# Patient Record
Sex: MALE | Race: WHITE | NOT HISPANIC OR LATINO | Employment: OTHER | URBAN - METROPOLITAN AREA
[De-identification: names, ages, dates, MRNs, and addresses within clinical notes are randomized per-mention and may not be internally consistent; named-entity substitution may affect disease eponyms.]

---

## 2017-07-09 ENCOUNTER — HOSPITAL ENCOUNTER (OUTPATIENT)
Facility: MEDICAL CENTER | Age: 52
End: 2017-07-11
Attending: EMERGENCY MEDICINE | Admitting: INTERNAL MEDICINE
Payer: MEDICARE

## 2017-07-09 ENCOUNTER — RESOLUTE PROFESSIONAL BILLING HOSPITAL PROF FEE (OUTPATIENT)
Dept: HOSPITALIST | Facility: MEDICAL CENTER | Age: 52
End: 2017-07-09
Payer: MEDICARE

## 2017-07-09 ENCOUNTER — APPOINTMENT (OUTPATIENT)
Dept: RADIOLOGY | Facility: MEDICAL CENTER | Age: 52
End: 2017-07-09
Attending: EMERGENCY MEDICINE
Payer: MEDICARE

## 2017-07-09 ENCOUNTER — APPOINTMENT (OUTPATIENT)
Dept: RADIOLOGY | Facility: MEDICAL CENTER | Age: 52
End: 2017-07-09
Attending: INTERNAL MEDICINE
Payer: MEDICARE

## 2017-07-09 DIAGNOSIS — R47.81 SLURRED SPEECH: ICD-10-CM

## 2017-07-09 DIAGNOSIS — R53.1 WEAKNESS: ICD-10-CM

## 2017-07-09 PROBLEM — B20 HIV (HUMAN IMMUNODEFICIENCY VIRUS INFECTION) (HCC): Status: ACTIVE | Noted: 2017-07-09

## 2017-07-09 PROBLEM — R47.1 DYSARTHRIA: Status: ACTIVE | Noted: 2017-07-09

## 2017-07-09 LAB
ALBUMIN SERPL BCP-MCNC: 3.7 G/DL (ref 3.2–4.9)
ALBUMIN/GLOB SERPL: 1.2 G/DL
ALP SERPL-CCNC: 71 U/L (ref 30–99)
ALT SERPL-CCNC: 16 U/L (ref 2–50)
ANION GAP SERPL CALC-SCNC: 9 MMOL/L (ref 0–11.9)
APPEARANCE UR: CLEAR
APTT PPP: 25.2 SEC (ref 24.7–36)
AST SERPL-CCNC: 34 U/L (ref 12–45)
BASOPHILS # BLD AUTO: 0.6 % (ref 0–1.8)
BASOPHILS # BLD: 0.02 K/UL (ref 0–0.12)
BILIRUB SERPL-MCNC: 2.4 MG/DL (ref 0.1–1.5)
BILIRUB UR QL STRIP.AUTO: NEGATIVE
BNP SERPL-MCNC: 19 PG/ML (ref 0–100)
BUN SERPL-MCNC: 15 MG/DL (ref 8–22)
CALCIUM SERPL-MCNC: 8.3 MG/DL (ref 8.5–10.5)
CHLORIDE SERPL-SCNC: 105 MMOL/L (ref 96–112)
CO2 SERPL-SCNC: 21 MMOL/L (ref 20–33)
COLOR UR: YELLOW
CREAT SERPL-MCNC: 0.61 MG/DL (ref 0.5–1.4)
CULTURE IF INDICATED INDCX: NO UA CULTURE
EKG IMPRESSION: NORMAL
EOSINOPHIL # BLD AUTO: 0.16 K/UL (ref 0–0.51)
EOSINOPHIL NFR BLD: 4.9 % (ref 0–6.9)
ERYTHROCYTE [DISTWIDTH] IN BLOOD BY AUTOMATED COUNT: 44.1 FL (ref 35.9–50)
GFR SERPL CREATININE-BSD FRML MDRD: >60 ML/MIN/1.73 M 2
GLOBULIN SER CALC-MCNC: 3.1 G/DL (ref 1.9–3.5)
GLUCOSE SERPL-MCNC: 83 MG/DL (ref 65–99)
GLUCOSE UR STRIP.AUTO-MCNC: NEGATIVE MG/DL
HCT VFR BLD AUTO: 41.1 % (ref 42–52)
HGB BLD-MCNC: 14.7 G/DL (ref 14–18)
IMM GRANULOCYTES # BLD AUTO: 0.01 K/UL (ref 0–0.11)
IMM GRANULOCYTES NFR BLD AUTO: 0.3 % (ref 0–0.9)
INR PPP: 0.99 (ref 0.87–1.13)
KETONES UR STRIP.AUTO-MCNC: NEGATIVE MG/DL
LACTATE BLD-SCNC: 1.1 MMOL/L (ref 0.5–2)
LEUKOCYTE ESTERASE UR QL STRIP.AUTO: NEGATIVE
LYMPHOCYTES # BLD AUTO: 0.91 K/UL (ref 1–4.8)
LYMPHOCYTES NFR BLD: 28.1 % (ref 22–41)
MCH RBC QN AUTO: 34 PG (ref 27–33)
MCHC RBC AUTO-ENTMCNC: 35.8 G/DL (ref 33.7–35.3)
MCV RBC AUTO: 95.1 FL (ref 81.4–97.8)
MICRO URNS: NORMAL
MONOCYTES # BLD AUTO: 0.39 K/UL (ref 0–0.85)
MONOCYTES NFR BLD AUTO: 12 % (ref 0–13.4)
NEUTROPHILS # BLD AUTO: 1.75 K/UL (ref 1.82–7.42)
NEUTROPHILS NFR BLD: 54.1 % (ref 44–72)
NITRITE UR QL STRIP.AUTO: NEGATIVE
NRBC # BLD AUTO: 0 K/UL
NRBC BLD AUTO-RTO: 0 /100 WBC
PH UR STRIP.AUTO: 7 [PH]
PLATELET # BLD AUTO: 170 K/UL (ref 164–446)
PMV BLD AUTO: 9.8 FL (ref 9–12.9)
POTASSIUM SERPL-SCNC: 3.7 MMOL/L (ref 3.6–5.5)
PROT SERPL-MCNC: 6.8 G/DL (ref 6–8.2)
PROT UR QL STRIP: NEGATIVE MG/DL
PROTHROMBIN TIME: 13.4 SEC (ref 12–14.6)
RBC # BLD AUTO: 4.32 M/UL (ref 4.7–6.1)
RBC UR QL AUTO: NEGATIVE
SODIUM SERPL-SCNC: 135 MMOL/L (ref 135–145)
SP GR UR STRIP.AUTO: 1.02
TROPONIN I SERPL-MCNC: <0.01 NG/ML (ref 0–0.04)
WBC # BLD AUTO: 3.2 K/UL (ref 4.8–10.8)

## 2017-07-09 PROCEDURE — G0378 HOSPITAL OBSERVATION PER HR: HCPCS

## 2017-07-09 PROCEDURE — 86360 T CELL ABSOLUTE COUNT/RATIO: CPT

## 2017-07-09 PROCEDURE — 87535 HIV-1 PROBE&REVERSE TRNSCRPJ: CPT

## 2017-07-09 PROCEDURE — 93005 ELECTROCARDIOGRAM TRACING: CPT | Performed by: EMERGENCY MEDICINE

## 2017-07-09 PROCEDURE — 86618 LYME DISEASE ANTIBODY: CPT

## 2017-07-09 PROCEDURE — A9270 NON-COVERED ITEM OR SERVICE: HCPCS | Performed by: INTERNAL MEDICINE

## 2017-07-09 PROCEDURE — 70553 MRI BRAIN STEM W/O & W/DYE: CPT

## 2017-07-09 PROCEDURE — 80053 COMPREHEN METABOLIC PANEL: CPT

## 2017-07-09 PROCEDURE — 700105 HCHG RX REV CODE 258: Performed by: EMERGENCY MEDICINE

## 2017-07-09 PROCEDURE — 36415 COLL VENOUS BLD VENIPUNCTURE: CPT

## 2017-07-09 PROCEDURE — 71010 DX-CHEST-PORTABLE (1 VIEW): CPT

## 2017-07-09 PROCEDURE — 99220 PR INITIAL OBSERVATION CARE,LEVL III: CPT | Performed by: INTERNAL MEDICINE

## 2017-07-09 PROCEDURE — 99285 EMERGENCY DEPT VISIT HI MDM: CPT

## 2017-07-09 PROCEDURE — 83036 HEMOGLOBIN GLYCOSYLATED A1C: CPT

## 2017-07-09 PROCEDURE — 83605 ASSAY OF LACTIC ACID: CPT

## 2017-07-09 PROCEDURE — 83880 ASSAY OF NATRIURETIC PEPTIDE: CPT

## 2017-07-09 PROCEDURE — 84484 ASSAY OF TROPONIN QUANT: CPT

## 2017-07-09 PROCEDURE — 700117 HCHG RX CONTRAST REV CODE 255: Performed by: INTERNAL MEDICINE

## 2017-07-09 PROCEDURE — 85610 PROTHROMBIN TIME: CPT

## 2017-07-09 PROCEDURE — 700102 HCHG RX REV CODE 250 W/ 637 OVERRIDE(OP): Performed by: INTERNAL MEDICINE

## 2017-07-09 PROCEDURE — 85730 THROMBOPLASTIN TIME PARTIAL: CPT

## 2017-07-09 PROCEDURE — 87040 BLOOD CULTURE FOR BACTERIA: CPT | Mod: 91

## 2017-07-09 PROCEDURE — 700105 HCHG RX REV CODE 258: Performed by: INTERNAL MEDICINE

## 2017-07-09 PROCEDURE — A9579 GAD-BASE MR CONTRAST NOS,1ML: HCPCS | Performed by: INTERNAL MEDICINE

## 2017-07-09 PROCEDURE — 70450 CT HEAD/BRAIN W/O DYE: CPT

## 2017-07-09 PROCEDURE — 86359 T CELLS TOTAL COUNT: CPT

## 2017-07-09 PROCEDURE — 81003 URINALYSIS AUTO W/O SCOPE: CPT

## 2017-07-09 PROCEDURE — 85025 COMPLETE CBC W/AUTO DIFF WBC: CPT

## 2017-07-09 RX ORDER — POLYETHYLENE GLYCOL 3350 17 G/17G
1 POWDER, FOR SOLUTION ORAL
Status: DISCONTINUED | OUTPATIENT
Start: 2017-07-09 | End: 2017-07-11 | Stop reason: HOSPADM

## 2017-07-09 RX ORDER — IBUPROFEN 200 MG
200 TABLET ORAL EVERY 6 HOURS PRN
Status: DISCONTINUED | OUTPATIENT
Start: 2017-07-09 | End: 2017-07-11 | Stop reason: HOSPADM

## 2017-07-09 RX ORDER — SODIUM CHLORIDE 9 MG/ML
2000 INJECTION, SOLUTION INTRAVENOUS ONCE
Status: COMPLETED | OUTPATIENT
Start: 2017-07-09 | End: 2017-07-09

## 2017-07-09 RX ORDER — RITONAVIR 100 MG/1
100 TABLET ORAL 2 TIMES DAILY WITH MEALS
Status: DISCONTINUED | OUTPATIENT
Start: 2017-07-09 | End: 2017-07-11 | Stop reason: HOSPADM

## 2017-07-09 RX ORDER — IBUPROFEN 200 MG
200 TABLET ORAL EVERY 6 HOURS PRN
COMMUNITY

## 2017-07-09 RX ORDER — SODIUM CHLORIDE 9 MG/ML
1000 INJECTION, SOLUTION INTRAVENOUS ONCE
Status: DISCONTINUED | OUTPATIENT
Start: 2017-07-09 | End: 2017-07-09

## 2017-07-09 RX ORDER — ALPRAZOLAM 0.5 MG/1
1 TABLET ORAL ONCE
Status: COMPLETED | OUTPATIENT
Start: 2017-07-09 | End: 2017-07-09

## 2017-07-09 RX ORDER — SODIUM CHLORIDE 9 MG/ML
INJECTION, SOLUTION INTRAVENOUS CONTINUOUS
Status: DISCONTINUED | OUTPATIENT
Start: 2017-07-09 | End: 2017-07-11 | Stop reason: HOSPADM

## 2017-07-09 RX ORDER — SULFAMETHOXAZOLE AND TRIMETHOPRIM 400; 80 MG/1; MG/1
1 TABLET ORAL DAILY
Status: DISCONTINUED | OUTPATIENT
Start: 2017-07-09 | End: 2017-07-11 | Stop reason: HOSPADM

## 2017-07-09 RX ORDER — BISACODYL 10 MG
10 SUPPOSITORY, RECTAL RECTAL
Status: DISCONTINUED | OUTPATIENT
Start: 2017-07-09 | End: 2017-07-11 | Stop reason: HOSPADM

## 2017-07-09 RX ORDER — ASPIRIN 325 MG
325 TABLET ORAL DAILY
Status: DISCONTINUED | OUTPATIENT
Start: 2017-07-09 | End: 2017-07-11 | Stop reason: HOSPADM

## 2017-07-09 RX ORDER — DIPHENHYDRAMINE HCL 25 MG
25 TABLET ORAL EVERY 8 HOURS PRN
Status: DISCONTINUED | OUTPATIENT
Start: 2017-07-09 | End: 2017-07-11 | Stop reason: HOSPADM

## 2017-07-09 RX ORDER — ASPIRIN 325 MG
325 TABLET ORAL DAILY
COMMUNITY

## 2017-07-09 RX ORDER — AMOXICILLIN 250 MG
2 CAPSULE ORAL 2 TIMES DAILY
Status: DISCONTINUED | OUTPATIENT
Start: 2017-07-09 | End: 2017-07-11 | Stop reason: HOSPADM

## 2017-07-09 RX ORDER — DOXYCYCLINE 100 MG/1
100 TABLET ORAL EVERY 12 HOURS
Status: DISCONTINUED | OUTPATIENT
Start: 2017-07-09 | End: 2017-07-11 | Stop reason: HOSPADM

## 2017-07-09 RX ADMIN — RALTEGRAVIR 400 MG: 400 TABLET, FILM COATED ORAL at 16:31

## 2017-07-09 RX ADMIN — RITONAVIR 100 MG: 100 TABLET, FILM COATED ORAL at 16:31

## 2017-07-09 RX ADMIN — GADODIAMIDE 20 ML: 287 INJECTION INTRAVENOUS at 18:59

## 2017-07-09 RX ADMIN — SODIUM CHLORIDE: 9 INJECTION, SOLUTION INTRAVENOUS at 15:27

## 2017-07-09 RX ADMIN — DIPHENHYDRAMINE HCL 25 MG: 25 TABLET ORAL at 16:30

## 2017-07-09 RX ADMIN — SODIUM CHLORIDE 2000 ML: 9 INJECTION, SOLUTION INTRAVENOUS at 11:26

## 2017-07-09 RX ADMIN — ASPIRIN 325 MG: 325 TABLET, COATED ORAL at 16:29

## 2017-07-09 RX ADMIN — DOXYCYCLINE 100 MG: 100 TABLET ORAL at 20:19

## 2017-07-09 RX ADMIN — RALTEGRAVIR 400 MG: 400 TABLET, FILM COATED ORAL at 20:19

## 2017-07-09 RX ADMIN — SULFAMETHOXAZOLE AND TRIMETHOPRIM 1 TABLET: 400; 80 TABLET ORAL at 16:31

## 2017-07-09 RX ADMIN — ALPRAZOLAM 1 MG: 0.5 TABLET ORAL at 17:57

## 2017-07-09 ASSESSMENT — PAIN SCALES - GENERAL
PAINLEVEL_OUTOF10: 0
PAINLEVEL_OUTOF10: 5
PAINLEVEL_OUTOF10: 0

## 2017-07-09 ASSESSMENT — LIFESTYLE VARIABLES
EVER_SMOKED: NEVER
ALCOHOL_USE: NO
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
## 2017-07-09 NOTE — IP AVS SNAPSHOT
" Home Care Instructions                                                                                                                  Name:Joaquina Suarez  Medical Record Number:[id number]  CSN: [id number]    YOB: 1965   Age: 51 y.o.  Sex: male  HT:1.753 m (5' 9.02\") WT: 77.5 kg (170 lb 13.7 oz)          Admit Date: 7/9/2017     Discharge Date:   Today's Date: 7/11/2017  Attending Doctor:  Brandon Wright M.D.                  Allergies:  Pcn            Discharge Instructions       Discharge Instructions    Discharged to home by taxi with self. Discharged via walking, hospital escort: Refused.  Special equipment needed: Not Applicable    Be sure to schedule a follow-up appointment with your primary care doctor or any specialists as instructed.     Discharge Plan:   Influenza Vaccine Indication: Patient Refuses    I understand that a diet low in cholesterol, fat, and sodium is recommended for good health. Unless I have been given specific instructions below for another diet, I accept this instruction as my diet prescription.   Other diet: Regular    Special Instructions: None    · Is patient discharged on Warfarin / Coumadin?   No     · Is patient Post Blood Transfusion?  No    Depression / Suicide Risk    As you are discharged from this [facility] facility, it is important to learn how to keep safe from harming yourself.    Recognize the warning signs:  · Abrupt changes in personality, positive or negative- including increase in energy   · Giving away possessions  · Change in eating patterns- significant weight changes-  positive or negative  · Change in sleeping patterns- unable to sleep or sleeping all the time   · Unwillingness or inability to communicate  · Depression  · Unusual sadness, discouragement and loneliness  · Talk of wanting to die  · Neglect of personal appearance   · Rebelliousness- reckless behavior  · Withdrawal from people/activities they love  · Confusion- inability to " concentrate     If you or a loved one observes any of these behaviors or has concerns about self-harm, here's what you can do:  · Talk about it- your feelings and reasons for harming yourself  · Remove any means that you might use to hurt yourself (examples: pills, rope, extension cords, firearm)  · Get professional help from the community (Mental Health, Substance Abuse, psychological counseling)  · Do not be alone:Call your Safe Contact- someone whom you trust who will be there for you.  · Call your local CRISIS HOTLINE 483-4265 or 467-742-9998  · Call your local Children's Mobile Crisis Response Team [place] (663) 952-3875 or www.Goko  · Call the toll free National Suicide Prevention Hotlines   · National Suicide Prevention Lifeline 815-634-IWFS (6147)  · [place] [facility] Line Network 800-SUICIDE (041-8598)           Discharge Medication Instructions:    Below are the medications your physician expects you to take upon discharge:    Review all your home medications and newly ordered medications with your doctor and/or pharmacist. Follow medication instructions as directed by your doctor and/or pharmacist.    Please keep your medication list with you and share with your physician.               Medication List      START taking these medications        Instructions    Morning Afternoon Evening Bedtime    doxycycline monohydrate 100 MG tablet   Last time this was given:  100 mg on 7/11/2017 10:25 AM   Commonly known as:  ADOXA        Take 1 Tab by mouth every 12 hours for 14 days.   Dose:  100 mg                        sulfamethoxazole-trimethoprim 400-80 MG Tabs   Last time this was given:  1 Tab on 7/11/2017 10:25 AM   Commonly known as:  BACTRIM        Take 1 Tab by mouth every day for 30 days.   Dose:  1 Tab                          CONTINUE taking these medications        Instructions    Morning Afternoon Evening Bedtime    aspirin 325 MG Tabs   Last time this was given:  325 mg on 7/11/2017  10:25 AM   Commonly known as:  ASA        Take 325 mg by mouth every day.   Dose:  325 mg                        DESCOVY 200-25 MG Tabs   Last time this was given:  1 Tab on 7/11/2017 10:25 AM   Generic drug:  Emtricitabine-Tenofovir AF        Take 1 Tab by mouth every day.   Dose:  1 Tab                        ibuprofen 200 MG Tabs   Commonly known as:  MOTRIN        Take 200 mg by mouth every 6 hours as needed. Indications: Inflammation   Dose:  200 mg                        PREZISTA 600 MG Tabs   Last time this was given:  600 mg on 7/11/2017 10:25 AM   Generic drug:  darunavir        Take 600 mg by mouth 2 times a day, with meals.   Dose:  600 mg                        raltegravir 400 MG Tabs   Last time this was given:  400 mg on 7/11/2017 10:25 AM   Commonly known as:  ISENTRESS        Take 400 mg by mouth 2 Times a Day.   Dose:  400 mg                        ritonavir 100 MG Caps   Commonly known as:  NORVIR        Take 100 mg by mouth 2 times a day, with meals.   Dose:  100 mg                             Where to Get Your Medications      Information about where to get these medications is not yet available     ! Ask your nurse or doctor about these medications    - doxycycline monohydrate 100 MG tablet  - sulfamethoxazole-trimethoprim 400-80 MG Tabs            Instructions           Diet / Nutrition:    Follow any diet instructions given to you by your doctor or the dietician, including how much salt (sodium) you are allowed each day.    If you are overweight, talk to your doctor about a weight reduction plan.    Activity:    Remain physically active following your doctor's instructions about exercise and activity.    Rest often.     Any time you become even a little tired or short of breath, SIT DOWN and rest.    Worsening Symptoms:    Report any of the following signs and symptoms to the doctor's office immediately:    *Pain of jaw, arm, or neck  *Chest pain not relieved by medication                                *Dizziness or loss of consciousness  *Difficulty breathing even when at rest   *More tired than usual                                       *Bleeding drainage or swelling of surgical site  *Swelling of feet, ankles, legs or stomach                 *Fever (>100ºF)  *Pink or blood tinged sputum  *Weight gain (3lbs/day or 5lbs /week)           *Shock from internal defibrillator (if applicable)  *Palpitations or irregular heartbeats                *Cool and/or numb extremities    Stroke Awareness    Common Risk Factors for Stroke include:    Age  Atrial Fibrillation  Carotid Artery Stenosis  Diabetes Mellitus  Excessive alcohol consumption  High blood pressure  Overweight   Physical inactivity  Smoking    Warning signs and symptoms of a stroke include:    *Sudden numbness or weakness of the face, arm or leg (especially on one side of the body).  *Sudden confusion, trouble speaking or understanding.  *Sudden trouble seeing in one or both eyes.  *Sudden trouble walking, dizziness, loss of balance or coordination.Sudden severe headache with no known cause.    It is very important to get treatment quickly when a stroke occurs. If you experience any of the above warning signs, call 911 immediately.                   Disclaimer         Quit Smoking / Tobacco Use:    I understand the use of any tobacco products increases my chance of suffering from future heart disease or stroke and could cause other illnesses which may shorten my life. Quitting the use of tobacco products is the single most important thing I can do to improve my health. For further information on smoking / tobacco cessation call a Toll Free Quit Line at 1-675.373.2204 (*[employer]) or 1-325.193.7316 (American Lung Association) or you can access the web based program at www.lungusa.org.    [place] Tobacco Users Help Line:  (163) 880-7556       Toll Free: 1-865.952.1334  Quit Tobacco Program [facility]  (412) 475-6204    Crisis Hotline:    [place] Crisis Hotline:  5-758-XEEFQBV or 1-926.960.8292    [place] Crisis Hotline:    1-285.790.7761 or 852-282-3144    Discharge Survey:   Thank you for choosing [facility]. We hope we did everything we could to make your hospital stay a pleasant one. You may be receiving a phone survey and we would appreciate your time and participation in answering the questions. Your input is very valuable to us in our efforts to improve our service to our patients and their families.        My signature on this form indicates that:    1. I have reviewed and understand the above information.  2. My questions regarding this information have been answered to my satisfaction.  3. I have formulated a plan with my discharge nurse to obtain my prescribed medications for home.                  Disclaimer         __________________________________                     __________       ________                       Patient Signature                                                 Date                    Time

## 2017-07-09 NOTE — ED NOTES
Med rec complete per patient.  Allergies reviewed.  Pt states he also takes Xarelto 15 mg PRN when he gets leg pain, which he hasn't taken in >1 month.

## 2017-07-09 NOTE — PROGRESS NOTES
1445  Report from RAEANN Rasmussen.    1500  Assumed care of Pt at 1500 when Pt transferred to T215 via [facility], assessment complete.  Pt resting comfortably, A & O X 4, VSS but sinus jake with HR of 52; Pt denies pain, discomfort at this time.  Neuro assessment negative for new deficits;  normal, strong, equal in strength; no limb drift, dysphasia, dysphagia, weakness, confusion.  Bedside dysphagia screening completed; Pt has no problems with choking, gagging, drooling; NIHSS score of 1 for facial droop, which is baseline per Pt due to old CVA.  Pt updated on and understands POC; diet ordered per Dr. Edmondson's written order; Pt given boxed lunch and water; MRI screening sheet completed and faxed; bed in low position, call light within reach - will continue to monitor.
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
## 2017-07-09 NOTE — ED NOTES
"Pt galilea vergara from [place]. Pt was through hiking the [place]. Pt woke up this morning feeling \"weird\". Pt sts he had slurred speech weakness which has since resolved. Pt has hx of cva in 2008 with L sided deficits. Pt also has multiple mosquito bites. vss no acute distress noted.   "

## 2017-07-09 NOTE — ED NOTES
Pt transferred to floor by transport, pt aware of POC, belongings on [facility].

## 2017-07-10 ENCOUNTER — PATIENT OUTREACH (OUTPATIENT)
Dept: HEALTH INFORMATION MANAGEMENT | Facility: OTHER | Age: 52
End: 2017-07-10

## 2017-07-10 ENCOUNTER — APPOINTMENT (OUTPATIENT)
Dept: RADIOLOGY | Facility: MEDICAL CENTER | Age: 52
End: 2017-07-10
Attending: INTERNAL MEDICINE
Payer: MEDICARE

## 2017-07-10 LAB
ANION GAP SERPL CALC-SCNC: 8 MMOL/L (ref 0–11.9)
BUN SERPL-MCNC: 13 MG/DL (ref 8–22)
CALCIUM SERPL-MCNC: 8.3 MG/DL (ref 8.5–10.5)
CHLORIDE SERPL-SCNC: 109 MMOL/L (ref 96–112)
CHOLEST SERPL-MCNC: 146 MG/DL (ref 100–199)
CO2 SERPL-SCNC: 16 MMOL/L (ref 20–33)
CREAT SERPL-MCNC: 0.56 MG/DL (ref 0.5–1.4)
ERYTHROCYTE [DISTWIDTH] IN BLOOD BY AUTOMATED COUNT: 45.1 FL (ref 35.9–50)
EST. AVERAGE GLUCOSE BLD GHB EST-MCNC: 97 MG/DL
GFR SERPL CREATININE-BSD FRML MDRD: >60 ML/MIN/1.73 M 2
GLUCOSE SERPL-MCNC: 85 MG/DL (ref 65–99)
HBA1C MFR BLD: 5 % (ref 0–5.6)
HCT VFR BLD AUTO: 40.8 % (ref 42–52)
HDLC SERPL-MCNC: 50 MG/DL
HGB BLD-MCNC: 14.1 G/DL (ref 14–18)
LDLC SERPL CALC-MCNC: 81 MG/DL
MCH RBC QN AUTO: 34.1 PG (ref 27–33)
MCHC RBC AUTO-ENTMCNC: 34.6 G/DL (ref 33.7–35.3)
MCV RBC AUTO: 98.8 FL (ref 81.4–97.8)
PLATELET # BLD AUTO: 137 K/UL (ref 164–446)
PMV BLD AUTO: 9.8 FL (ref 9–12.9)
POTASSIUM SERPL-SCNC: 3.7 MMOL/L (ref 3.6–5.5)
RBC # BLD AUTO: 4.13 M/UL (ref 4.7–6.1)
SODIUM SERPL-SCNC: 133 MMOL/L (ref 135–145)
TRIGL SERPL-MCNC: 73 MG/DL (ref 0–149)
WBC # BLD AUTO: 3.3 K/UL (ref 4.8–10.8)

## 2017-07-10 PROCEDURE — 99225 PR SUBSEQUENT OBSERVATION CARE,LEVEL II: CPT | Performed by: HOSPITALIST

## 2017-07-10 PROCEDURE — G0378 HOSPITAL OBSERVATION PER HR: HCPCS

## 2017-07-10 PROCEDURE — 85027 COMPLETE CBC AUTOMATED: CPT

## 2017-07-10 PROCEDURE — 700102 HCHG RX REV CODE 250 W/ 637 OVERRIDE(OP): Performed by: INTERNAL MEDICINE

## 2017-07-10 PROCEDURE — A9270 NON-COVERED ITEM OR SERVICE: HCPCS | Performed by: INTERNAL MEDICINE

## 2017-07-10 PROCEDURE — 700105 HCHG RX REV CODE 258: Performed by: INTERNAL MEDICINE

## 2017-07-10 PROCEDURE — 36415 COLL VENOUS BLD VENIPUNCTURE: CPT

## 2017-07-10 PROCEDURE — 96372 THER/PROPH/DIAG INJ SC/IM: CPT

## 2017-07-10 PROCEDURE — 80061 LIPID PANEL: CPT

## 2017-07-10 PROCEDURE — 80048 BASIC METABOLIC PNL TOTAL CA: CPT

## 2017-07-10 PROCEDURE — 700111 HCHG RX REV CODE 636 W/ 250 OVERRIDE (IP): Performed by: INTERNAL MEDICINE

## 2017-07-10 RX ADMIN — SODIUM CHLORIDE: 9 INJECTION, SOLUTION INTRAVENOUS at 01:49

## 2017-07-10 RX ADMIN — DIPHENHYDRAMINE HCL 25 MG: 25 TABLET ORAL at 21:54

## 2017-07-10 RX ADMIN — RALTEGRAVIR 400 MG: 400 TABLET, FILM COATED ORAL at 09:43

## 2017-07-10 RX ADMIN — RITONAVIR 100 MG: 100 TABLET, FILM COATED ORAL at 09:43

## 2017-07-10 RX ADMIN — ENOXAPARIN SODIUM 40 MG: 100 INJECTION SUBCUTANEOUS at 09:43

## 2017-07-10 RX ADMIN — STANDARDIZED SENNA CONCENTRATE AND DOCUSATE SODIUM 2 TABLET: 8.6; 5 TABLET, FILM COATED ORAL at 09:43

## 2017-07-10 RX ADMIN — SULFAMETHOXAZOLE AND TRIMETHOPRIM 1 TABLET: 400; 80 TABLET ORAL at 09:43

## 2017-07-10 RX ADMIN — ASPIRIN 325 MG: 325 TABLET, COATED ORAL at 09:43

## 2017-07-10 RX ADMIN — DOXYCYCLINE 100 MG: 100 TABLET ORAL at 21:51

## 2017-07-10 RX ADMIN — RITONAVIR 100 MG: 100 TABLET, FILM COATED ORAL at 18:57

## 2017-07-10 RX ADMIN — DOXYCYCLINE 100 MG: 100 TABLET ORAL at 09:43

## 2017-07-10 ASSESSMENT — ENCOUNTER SYMPTOMS
NEUROLOGICAL NEGATIVE: 1
SORE THROAT: 0
DOUBLE VISION: 0
MYALGIAS: 1
ROS SKIN COMMENTS: MOSQUITO BITES
HEADACHES: 0
DEPRESSION: 0
BRUISES/BLEEDS EASILY: 0
PSYCHIATRIC NEGATIVE: 1
SHORTNESS OF BREATH: 0
BLURRED VISION: 0
CHILLS: 0
NAUSEA: 0
PALPITATIONS: 0
CARDIOVASCULAR NEGATIVE: 1
DIARRHEA: 1
ABDOMINAL PAIN: 0
CONSTIPATION: 0
TINGLING: 0
EYES NEGATIVE: 1
FOCAL WEAKNESS: 0
WEAKNESS: 0
FEVER: 0
COUGH: 0
DIZZINESS: 0
DIARRHEA: 0
RESPIRATORY NEGATIVE: 1
VOMITING: 0
GASTROINTESTINAL NEGATIVE: 1

## 2017-07-10 ASSESSMENT — PAIN SCALES - GENERAL
PAINLEVEL_OUTOF10: 0
PAINLEVEL_OUTOF10: 0

## 2017-07-10 NOTE — THERAPY
PT orders received. Per RN and OT, pt has been up self. No acute PT needs. Orders D/C'garfield Wright, PT, DPT Pager: [phone]

## 2017-07-10 NOTE — PROGRESS NOTES
Report received from Penny RN. Pt back from MRI, eating dinner. Bed in low position, call light within reach.

## 2017-07-10 NOTE — PROGRESS NOTES
"Report received, assumed patient care.  Pt A&OX4.  Assessment completed.  Call light within reach, personal belongings available, bed in lowest position, pt calling for assistance.  Pt reports no pain.  -n/t pt states, \"It comes and goes.\"  Communication board updated, POC discussed.  Monitors reapplied, VSS.  No additional needs at this time.  "

## 2017-07-10 NOTE — PROGRESS NOTES
Assessed pt. A&O x4. Denies n/t, nausea, pain, and SOB. C/o itchiness, benadryl already given, pt aware. Lab specimen collected and sent to lab. POC discussed with pt. Hourly rounding in place.

## 2017-07-10 NOTE — PROGRESS NOTES
Infectious Disease Progress Note    Author: Meenu Galaviz M.D. DOS & Time created: 7/10/2017  12:21 PM    Chief Complaint:  Chief Complaint   Patient presents with   • Possible Stroke     Interval History:  7/10 AF, WBC 3.3, LP not done, MRI with no acute abnl, having diarrhea which is new  Labs Reviewed, Medications Reviewed, Radiology Reviewed and Wound Reviewed.    Review of Systems:  Review of Systems   Constitutional: Negative for fever and chills.   Respiratory: Negative for cough and shortness of breath.    Gastrointestinal: Positive for diarrhea. Negative for nausea, vomiting and abdominal pain.   Skin: Positive for rash.   Neurological: Negative for headaches.       Hemodynamics:  Temp (24hrs), Av.6 °C (97.8 °F), Min:36.2 °C (97.2 °F), Max:36.8 °C (98.3 °F)  Temperature: 36.6 °C (97.9 °F)  Pulse  Av.1  Min: 52  Max: 68Heart Rate (Monitored): (!) 49  Blood Pressure: 122/74 mmHg, NIBP: 116/71 mmHg       Physical Exam:  Physical Exam   Constitutional: He is oriented to person, place, and time. He appears well-developed.   HENT:   Head: Normocephalic and atraumatic.   Eyes: EOM are normal. Pupils are equal, round, and reactive to light.   Neck: Neck supple.   Cardiovascular: Normal rate, regular rhythm and normal heart sounds.    Pulmonary/Chest: Effort normal and breath sounds normal.   Abdominal: Soft. There is no tenderness.   Musculoskeletal: He exhibits no edema.   Neurological: He is alert and oriented to person, place, and time.   Skin:   Diffuse mosquito bites with scabbing       Meds:    Current facility-administered medications:   •  aspirin (ASA) tablet 325 mg, 325 mg, Oral, DAILY, Ankit Edmondson M.D., 325 mg at 07/10/17 0943  •  darunavir (PREZISTA) tablet 600 mg, 600 mg, Oral, BID WITH MEALS, Ankit Edmondson M.D., 600 mg at 17 1920  •  Emtricitabine-Tenofovir -25 MG TABS 1 Tab, 1 Tab, Oral, DAILY, Ankit Edmondson M.D., 1 Tab at 07/10/17 0900  •  ibuprofen (MOTRIN) tablet  200 mg, 200 mg, Oral, Q6HRS PRN, Ankit Edmondson M.D.  •  raltegravir (ISENTRESS) tablet 400 mg, 400 mg, Oral, BID, Ankit Edmondson M.D., 400 mg at 07/10/17 0943  •  ritonavir (NORVIR) tablet 100 mg, 100 mg, Oral, BID WITH MEALS, Ankit Edmondson M.D., 100 mg at 07/10/17 0943  •  senna-docusate (PERICOLACE or SENOKOT S) 8.6-50 MG per tablet 2 Tab, 2 Tab, Oral, BID, 2 Tab at 07/10/17 0943 **AND** polyethylene glycol/lytes (MIRALAX) PACKET 1 Packet, 1 Packet, Oral, QDAY PRN **AND** magnesium hydroxide (MILK OF MAGNESIA) suspension 30 mL, 30 mL, Oral, QDAY PRN **AND** bisacodyl (DULCOLAX) suppository 10 mg, 10 mg, Rectal, QDAY PRN, Ankit Edmondson M.D.  •  NS infusion, , Intravenous, Continuous, Ankit Edmondson M.D., Last Rate: 125 mL/hr at 07/10/17 0658  •  enoxaparin (LOVENOX) inj 40 mg, 40 mg, Subcutaneous, DAILY, Ankit Edmondson M.D., 40 mg at 07/10/17 0943  •  sulfamethoxazole-trimethoprim (BACTRIM) 400-80 MG per tablet 1 Tab, 1 Tab, Oral, DAILY, Joanne Ivan M.D., 1 Tab at 07/10/17 0943  •  doxycycline monohydrate (ADOXA) tablet 100 mg, 100 mg, Oral, Q12HRS, Ankit Edmondson M.D., 100 mg at 07/10/17 0943  •  diphenhydrAMINE (BENADRYL) tablet/capsule 25 mg, 25 mg, Oral, Q8HRS PRN, Ankit Edmondson M.D., 25 mg at 07/09/17 1630    Labs:  Recent Labs      07/09/17   1004  07/10/17   0330   WBC  3.2*  3.3*   RBC  4.32*  4.13*   HEMOGLOBIN  14.7  14.1   HEMATOCRIT  41.1*  40.8*   MCV  95.1  98.8*   MCH  34.0*  34.1*   RDW  44.1  45.1   PLATELETCT  170  137*   MPV  9.8  9.8   NEUTSPOLYS  54.10   --    LYMPHOCYTES  28.10   --    MONOCYTES  12.00   --    EOSINOPHILS  4.90   --    BASOPHILS  0.60   --      Recent Labs      07/09/17   1004  07/10/17   0330   SODIUM  135  133*   POTASSIUM  3.7  3.7   CHLORIDE  105  109   CO2  21  16*   GLUCOSE  83  85   BUN  15  13     Recent Labs      07/09/17   1004  07/10/17   0330   ALBUMIN  3.7   --    TBILIRUBIN  2.4*   --    ALKPHOSPHAT  71   --    TOTPROTEIN  6.8   --    ALTSGPT  16    --    ASTSGOT  34   --    CREATININE  0.61  0.56       Imaging:  Ct-head W/o    7/9/2017 7/9/2017 11:10 AM HISTORY/REASON FOR EXAM:  Neurological Deficit. History of brain surgery TECHNIQUE/EXAM DESCRIPTION AND NUMBER OF VIEWS: CT of the head without contrast. The study was performed on a helical multidetector CT scanner. Contiguous 2.5 mm axial sections were obtained from the skull base through the vertex. Up to date radiation dose reduction adjustments have been utilized to meet ALARA standards for radiation dose reduction. COMPARISON:  None available FINDINGS: Patient is status post right frontoparietal craniotomy. There is underlying encephalomalacia. No intracranial hemorrhage, midline shift or mass effect. No extra-axial fluid collections identified. There is ex vacuo dilatation of the right lateral ventricle. Paranasal sinuses in the field of view are unremarkable. Mastoids in the field of view are unremarkable.     7/9/2017  Status post right craniotomy with underlying encephalomalacia without evidence of acute intracranial process.    Dx-chest-portable (1 View)    7/9/2017 7/9/2017 11:04 AM HISTORY/REASON FOR EXAM:  Sepsis. Chest tightness. TECHNIQUE/EXAM DESCRIPTION AND NUMBER OF VIEWS: Single portable view of the chest. COMPARISON: None FINDINGS: Heart size is within normal limits. No pulmonary infiltrates or consolidations are noted. No pleural abnormalities are noted.     7/9/2017  No acute cardiac or pulmonary abnormalities are identified.    Mr-brain-with & W/o    7/10/2017  7/9/2017 6:27 PM HISTORY/REASON FOR EXAM:  Previous craniotomy. History of stroke and dysarthria. Currently with slurred speech.. TECHNIQUE/EXAM DESCRIPTION:   MRI of the brain without and with contrast. T1 sagittal, T2 fast spin-echo axial, T1 coronal, FLAIR coronal, diffusion-weighted and apparent diffusion coefficient (ADC map) axial images were obtained of the whole brain. T1 postcontrast axial and T1 postcontrast coronal  images were obtained. The study was performed on a Zenringa 1.5 Vee MRI scanner. 17 mL Omniscan contrast was administered intravenously. COMPARISON:  Head CT 7/9/2017 FINDINGS: Previous right frontal craniotomy. There is a small post ovoid postsurgical defect in the right posterior frontal lobe. There is surrounding area of decreased T1 and increased T2 signal intensity in the adjacent right frontal white matter. There is no significant abnormal enhancement in this region.. There are no extra-axial fluid collections. The ventricular system and basal cisterns are mild to moderately prominent. There is mild ex vacuo dilatation of the right frontal horn. There is mild prominence of the cortical sulci and gyri. Additionally there is curvilinear region of increased T2 signal intensity in the subcortical white matter in the left posterior frontal region. There are no mass effects or shift of midline structures. There are  no hemorrhagic lesions. The diffusion-weighted axial images show no evidence of acute cerebral infarction. The postcontrast images show no areas of abnormal parenchymal or meningeal enhancement. The brainstem and posterior fossa structures are unremarkable. Vascular flow voids in the vertebrobasilar and carotid arteries, [place], and dural venous sinuses are intact. The paranasal sinuses and mastoids in the field of view are unremarkable.     7/10/2017  1.  Previous right frontal craniotomy with underlying postsurgical defect and surrounding encephalomalacia. 2.  No evidence of abnormal enhancement in the brain parenchyma. 3.  Mild diffuse cerebral atrophy with mild ex vacuo dilatation of the right frontal horn. 4.  Small chronic area of microvascular ischemic change or chronic infarction in the subcortical white matter in the left posterior frontal region.      Micro:  BLOOD CULTURE   Date Value Ref Range Status   07/09/2017   Preliminary    No Growth    Note: Blood cultures are incubated  for 5 days and  are monitored continuously.Positive blood cultures  are called to the RN and reported as soon as  they are identified.          Assessment:  Active Hospital Problems    Diagnosis   • Dysarthria [R47.1]   • HIV (human immunodeficiency virus infection) (CMS-HCC) [Z21]       Plan:  Possible CVA  CT head neg  MRI negative for acute abnl  Lyme serology pending  Continue doxy  LP canceled    HIV  CD4 count - pending  HIV viral load - pending  Continue bactrim for PCP ppx  Follows up with HIV clinic near home in [place]  Continue ART    Leukopenia  ? Chronic    Discussed with internal Medicine

## 2017-07-10 NOTE — PROGRESS NOTES
"Hospital Medicine Interval Note  Date of Service:  7/10/2017    Chief Complaint  51 y.o.-year-old male admitted 7/9/2017 with left leg weakness and dysarthria worse than his usual after prev CVA. [place]- multi-mosquito bites.    Interval Problem Update  7/10- symptoms resolved other than generalized not feeling well    No fever    No neck stiffness    Consultants/Specialty  ID- [facility]    Disposition  Appreciate ID recs, follow viral serology, cont to monitor, poss to discharge tomorrow- SW assistance with getting him back to [place]     Review of Systems   Constitutional: Positive for malaise/fatigue (\"just don't feel well\"). Negative for fever and chills.   HENT: Negative.  Negative for congestion and sore throat.    Eyes: Negative.  Negative for blurred vision and double vision.   Respiratory: Negative.  Negative for cough and shortness of breath.    Cardiovascular: Negative.  Negative for chest pain, palpitations and leg swelling.   Gastrointestinal: Negative.  Negative for nausea, vomiting, diarrhea and constipation.   Genitourinary: Negative.  Negative for dysuria.   Musculoskeletal: Positive for myalgias. Negative for joint pain.   Skin: Negative.  Negative for itching.        Mosquito bites   Neurological: Negative.  Negative for dizziness, tingling, focal weakness, weakness and headaches.   Endo/Heme/Allergies: Negative.  Does not bruise/bleed easily.   Psychiatric/Behavioral: Negative.  Negative for depression.      Physical Exam Laboratory/Imaging   Filed Vitals:    07/10/17 0435 07/10/17 0638 07/10/17 0801 07/10/17 1156   BP: 110/75  103/58 122/74   Pulse: 52 60 59 60   Temp: 36.4 °C (97.6 °F)  36.6 °C (97.8 °F) 36.6 °C (97.9 °F)   Resp: 15 18 18 16   Height:       Weight:       SpO2: 99%  96% 95%     Physical Exam   Constitutional: He is oriented to person, place, and time. He appears well-developed.   Slightly disheveled   HENT:   Head: Normocephalic and atraumatic.   Mouth/Throat: " Oropharynx is clear and moist. No oropharyngeal exudate.   Eyes: Conjunctivae are normal. Right eye exhibits no discharge. Left eye exhibits no discharge. No scleral icterus.   Neck: Normal range of motion. Neck supple. No tracheal deviation present.   Cardiovascular: Normal rate, regular rhythm, normal heart sounds and intact distal pulses.    Pulmonary/Chest: Effort normal and breath sounds normal.   Abdominal: Soft. Bowel sounds are normal. He exhibits no distension. There is no tenderness.   Musculoskeletal: Normal range of motion. He exhibits no edema or tenderness.   Neurological: He is alert and oriented to person, place, and time. Coordination normal.   Mild dysarthria- he states is back to his normal   Skin: Skin is warm and dry. He is not diaphoretic. No erythema.   Mosquito bites throughout body   Psychiatric:   Upset, rambling, diff focusing/answering questions  Focused only on getting his hiking equipment back from [place]- and finding out who's to blame for sending him here    Lab Results   Component Value Date/Time    WBC 3.3* 07/10/2017 03:30 AM    HEMOGLOBIN 14.1 07/10/2017 03:30 AM    HEMATOCRIT 40.8* 07/10/2017 03:30 AM    PLATELET COUNT 137* 07/10/2017 03:30 AM     Lab Results   Component Value Date/Time    SODIUM 133* 07/10/2017 03:30 AM    POTASSIUM 3.7 07/10/2017 03:30 AM    CHLORIDE 109 07/10/2017 03:30 AM    CO2 16* 07/10/2017 03:30 AM    GLUCOSE 85 07/10/2017 03:30 AM    BUN 13 07/10/2017 03:30 AM    CREATININE 0.56 07/10/2017 03:30 AM      Assessment/Plan    Dysarthria (present on admission)  Assessment & Plan  Back to baseline  LP canceled- af, no meningeal symptoms  MRI- no acute findings, prev changes d/t CVA- stable    HIV (human immunodeficiency virus infection) (CMS-[facility]) (present on admission)  Assessment & Plan  ID following  Cont bactrim/doxy  Cont anti-virals  Multiple viral load/serologies pending       EKG reviewed, Radiology images reviewed, Labs reviewed and Medications  reviewed  Jarrett catheter: No Jarrett      DVT Prophylaxis: Enoxaparin (Lovenox)  DVT prophylaxis - mechanical: Not indicated at this time, ambulatory  Ulcer prophylaxis: Not indicated  : bactrim/doxy.  Assessed for rehab: Patient was assess for and/or received rehabilitation services during this hospitalization

## 2017-07-10 NOTE — ASSESSMENT & PLAN NOTE
Back to baseline  LP canceled- af, no meningeal symptoms  MRI- no acute findings, prev changes d/t CVA- stable

## 2017-07-10 NOTE — DISCHARGE PLANNING
Care Transition Team Assessment    Information Source  Orientation : Oriented x 4  Information Given By: Patient         Elopement Risk  Legal Hold: No  Ambulatory or Self Mobile in Wheelchair: No-Not an Elopement Risk    Interdisciplinary Discharge Planning  Does Admitting Nurse Feel This Could be a Complex Discharge?: No  Lives with - Patient's Self Care Capacity: Significant Other  Patient or legal guardian wants to designate a caregiver (see row info): No  Support Systems: Family Member(s)  Do You Take your Prescribed Medications Regularly: Yes  Able to Return to Previous ADL's: Yes  Mobility Issues: No  Prior Services: None  Assistance Needed: No  Durable Medical Equipment: Not Applicable                   Vision / Hearing Impairment  Vision Impairment : No  Hearing Impairment : No              Domestic Abuse  Have you ever been the victim of abuse or violence?: No

## 2017-07-10 NOTE — THERAPY
Occupational Therapy order received. Pt up ad rae and denies having any functional deficits. No OT eval indicated in the acute care setting at this time as pt is at his baseline. RN aware.

## 2017-07-10 NOTE — PROGRESS NOTES
Call from DX re LP; DX requires attempt at bedside before performing IR LP.  Paged Dr. Edmondson to notify - waiting for call back.

## 2017-07-11 VITALS
HEIGHT: 69 IN | SYSTOLIC BLOOD PRESSURE: 128 MMHG | OXYGEN SATURATION: 98 % | TEMPERATURE: 98.1 F | BODY MASS INDEX: 25.31 KG/M2 | WEIGHT: 170.86 LBS | DIASTOLIC BLOOD PRESSURE: 79 MMHG | HEART RATE: 66 BPM | RESPIRATION RATE: 16 BRPM

## 2017-07-11 LAB
ANION GAP SERPL CALC-SCNC: 4 MMOL/L (ref 0–11.9)
B BURGDOR AB SER IA-ACNC: 0.38 LIV (ref 0–1.2)
BUN SERPL-MCNC: 14 MG/DL (ref 8–22)
CALCIUM SERPL-MCNC: 8.8 MG/DL (ref 8.5–10.5)
CHLORIDE SERPL-SCNC: 109 MMOL/L (ref 96–112)
CO2 SERPL-SCNC: 21 MMOL/L (ref 20–33)
CREAT SERPL-MCNC: 0.77 MG/DL (ref 0.5–1.4)
ERYTHROCYTE [DISTWIDTH] IN BLOOD BY AUTOMATED COUNT: 44.9 FL (ref 35.9–50)
GFR SERPL CREATININE-BSD FRML MDRD: >60 ML/MIN/1.73 M 2
GLUCOSE SERPL-MCNC: 86 MG/DL (ref 65–99)
HCT VFR BLD AUTO: 42.5 % (ref 42–52)
HGB BLD-MCNC: 14.6 G/DL (ref 14–18)
MCH RBC QN AUTO: 33.6 PG (ref 27–33)
MCHC RBC AUTO-ENTMCNC: 34.4 G/DL (ref 33.7–35.3)
MCV RBC AUTO: 97.9 FL (ref 81.4–97.8)
PLATELET # BLD AUTO: 161 K/UL (ref 164–446)
PMV BLD AUTO: 9.9 FL (ref 9–12.9)
POTASSIUM SERPL-SCNC: 4 MMOL/L (ref 3.6–5.5)
RBC # BLD AUTO: 4.34 M/UL (ref 4.7–6.1)
SODIUM SERPL-SCNC: 134 MMOL/L (ref 135–145)
WBC # BLD AUTO: 4.5 K/UL (ref 4.8–10.8)

## 2017-07-11 PROCEDURE — 85027 COMPLETE CBC AUTOMATED: CPT

## 2017-07-11 PROCEDURE — 700102 HCHG RX REV CODE 250 W/ 637 OVERRIDE(OP): Performed by: INTERNAL MEDICINE

## 2017-07-11 PROCEDURE — A9270 NON-COVERED ITEM OR SERVICE: HCPCS | Performed by: INTERNAL MEDICINE

## 2017-07-11 PROCEDURE — 80048 BASIC METABOLIC PNL TOTAL CA: CPT

## 2017-07-11 PROCEDURE — 700105 HCHG RX REV CODE 258: Performed by: INTERNAL MEDICINE

## 2017-07-11 PROCEDURE — 99217 PR OBSERVATION CARE DISCHARGE: CPT | Performed by: INTERNAL MEDICINE

## 2017-07-11 PROCEDURE — G0378 HOSPITAL OBSERVATION PER HR: HCPCS

## 2017-07-11 RX ORDER — SULFAMETHOXAZOLE AND TRIMETHOPRIM 400; 80 MG/1; MG/1
1 TABLET ORAL DAILY
Qty: 30 TAB | Refills: 1 | Status: SHIPPED | OUTPATIENT
Start: 2017-07-11 | End: 2017-08-10

## 2017-07-11 RX ORDER — DOXYCYCLINE 100 MG/1
100 TABLET ORAL EVERY 12 HOURS
Qty: 28 TAB | Refills: 0 | Status: SHIPPED | OUTPATIENT
Start: 2017-07-11 | End: 2017-07-25

## 2017-07-11 RX ADMIN — RITONAVIR 100 MG: 100 TABLET, FILM COATED ORAL at 10:25

## 2017-07-11 RX ADMIN — DOXYCYCLINE 100 MG: 100 TABLET ORAL at 10:25

## 2017-07-11 RX ADMIN — RALTEGRAVIR 400 MG: 400 TABLET, FILM COATED ORAL at 10:25

## 2017-07-11 RX ADMIN — SODIUM CHLORIDE: 9 INJECTION, SOLUTION INTRAVENOUS at 02:57

## 2017-07-11 RX ADMIN — SULFAMETHOXAZOLE AND TRIMETHOPRIM 1 TABLET: 400; 80 TABLET ORAL at 10:25

## 2017-07-11 RX ADMIN — ASPIRIN 325 MG: 325 TABLET, COATED ORAL at 10:25

## 2017-07-11 ASSESSMENT — ENCOUNTER SYMPTOMS
HEADACHES: 0
NAUSEA: 0
COUGH: 0
SHORTNESS OF BREATH: 0
DIARRHEA: 1
VOMITING: 0
FEVER: 0
CHILLS: 0
ABDOMINAL PAIN: 0

## 2017-07-11 ASSESSMENT — PAIN SCALES - GENERAL: PAINLEVEL_OUTOF10: 0

## 2017-07-11 NOTE — DISCHARGE INSTRUCTIONS
Discharge Instructions    Discharged to home by taxi with self. Discharged via walking, hospital escort: Refused.  Special equipment needed: Not Applicable    Be sure to schedule a follow-up appointment with your primary care doctor or any specialists as instructed.     Discharge Plan:   Influenza Vaccine Indication: Patient Refuses    I understand that a diet low in cholesterol, fat, and sodium is recommended for good health. Unless I have been given specific instructions below for another diet, I accept this instruction as my diet prescription.   Other diet: Regular    Special Instructions: None    · Is patient discharged on Warfarin / Coumadin?   No     · Is patient Post Blood Transfusion?  No    Depression / Suicide Risk    As you are discharged from this [facility] facility, it is important to learn how to keep safe from harming yourself.    Recognize the warning signs:  · Abrupt changes in personality, positive or negative- including increase in energy   · Giving away possessions  · Change in eating patterns- significant weight changes-  positive or negative  · Change in sleeping patterns- unable to sleep or sleeping all the time   · Unwillingness or inability to communicate  · Depression  · Unusual sadness, discouragement and loneliness  · Talk of wanting to die  · Neglect of personal appearance   · Rebelliousness- reckless behavior  · Withdrawal from people/activities they love  · Confusion- inability to concentrate     If you or a loved one observes any of these behaviors or has concerns about self-harm, here's what you can do:  · Talk about it- your feelings and reasons for harming yourself  · Remove any means that you might use to hurt yourself (examples: pills, rope, extension cords, firearm)  · Get professional help from the community (Mental Health, Substance Abuse, psychological counseling)  · Do not be alone:Call your Safe Contact- someone whom you trust who will be there for you.  · Call your  local CRISIS HOTLINE 634-0632 or 855-190-1624  · Call your local Children's Mobile Crisis Response Team [place] (530) 710-2606 or www.[facility]  · Call the toll free National Suicide Prevention Hotlines   · National Suicide Prevention Lifeline 680-861-VWBK (2189)  · National [place] Line Network 800-SUICIDE (651-5872)

## 2017-07-11 NOTE — PROGRESS NOTES
In with primary RN to speak with pt. As pt. Very upset over lack of resources to get pt. Back to [place] where he came from.  Gale and myself in speaking with pt. Pt. Reports he feels as though he shouldn't have been brought here and feels like he was forced to come here and is angry with the helicopter company who brought him here because they promised him a ride home.  Gale has spoke with case management Patty who is also aware of the situation.  Still attempting to figure out a solution to help pt. Get back to where he came from.

## 2017-07-11 NOTE — PROGRESS NOTES
Pt has no way to get back to [place].  Attempting to find more resources to help pt get back to [place], [place].

## 2017-07-11 NOTE — DISCHARGE SUMMARY
CHIEF COMPLAINT ON ADMISSION  Chief Complaint   Patient presents with   • Possible Stroke       CODE STATUS  Full Code    HPI & HOSPITAL COURSE  This is a 51 y.o. male here with h/o HIV managed by clinic in [place] who presented here for worsening dysarthria, mosquito bites, ED he was worked up for stroke with no acute findings on MRI. Questionable HIV meningitis but Neuro was consulted and did not recommend an LP. The patients symptoms did resolve. ID was consulted. Patient is not on Bactrim outpatient. He was started on bactrim for PCP prophylaxis and doxycycline. His serologies are pending for lyme, EBV, fungal, and CD4 count. He is stable now for discharge and can follow up with his clinic closely. Will continue doxy for 14 days and Bactrim chronically until he can follow up per ID.     Therefore, he is discharged in good and stable condition with close outpatient follow-up.    SPECIFIC OUTPATIENT FOLLOW-UP  HIV clinic in [place] where he resides     DISCHARGE PROBLEM LIST  Active Problems:    Dysarthria POA: Yes    HIV (human immunodeficiency virus infection) (CMS-[facility]) POA: Yes  Resolved Problems:    * No resolved hospital problems. *      FOLLOW UP  No future appointments.  No follow-up provider specified.    MEDICATIONS ON DISCHARGE   Joaquina Suarez   Home Medication Instructions ROCIO:[id number]    Printed on:07/11/17 1242   Medication Information                      aspirin (ASA) 325 MG Tab  Take 325 mg by mouth every day.             darunavir (PREZISTA) 600 MG Tab  Take 600 mg by mouth 2 times a day, with meals.             doxycycline monohydrate (ADOXA) 100 MG tablet  Take 1 Tab by mouth every 12 hours for 14 days.   NEW          Emtricitabine-Tenofovir AF (DESCOVY) 200-25 MG Tab  Take 1 Tab by mouth every day.             ibuprofen (MOTRIN) 200 MG Tab  Take 200 mg by mouth every 6 hours as needed. Indications: Inflammation             raltegravir (ISENTRESS) 400 MG Tab  Take 400 mg by mouth 2  Times a Day.             ritonavir (NORVIR) 100 MG Cap  Take 100 mg by mouth 2 times a day, with meals.             sulfamethoxazole-trimethoprim (BACTRIM) 400-80 MG Tab  Take 1 Tab by mouth every day for 30 days.                 DIET  Orders Placed This Encounter   Procedures   • DIET ORDER     Standing Status: Standing      Number of Occurrences: 1      Standing Expiration Date:      Order Specific Question:  Diet:     Answer:  Regular [1]       ACTIVITY  As tolerated.  Weight bearing as tolerated      CONSULTATIONS  ID  Neuro    PROCEDURES  No     LABORATORY  Lab Results   Component Value Date/Time    SODIUM 134* 07/11/2017 03:05 AM    POTASSIUM 4.0 07/11/2017 03:05 AM    CHLORIDE 109 07/11/2017 03:05 AM    CO2 21 07/11/2017 03:05 AM    GLUCOSE 86 07/11/2017 03:05 AM    BUN 14 07/11/2017 03:05 AM    CREATININE 0.77 07/11/2017 03:05 AM        Lab Results   Component Value Date/Time    WBC 4.5* 07/11/2017 03:05 AM    HEMOGLOBIN 14.6 07/11/2017 03:05 AM    HEMATOCRIT 42.5 07/11/2017 03:05 AM    PLATELET COUNT 161* 07/11/2017 03:05 AM        Total time of the discharge process exceeds 36 minutes

## 2017-07-11 NOTE — PROGRESS NOTES
Pt has d/c orders.  All of pt belongings are in [facility].  Contacted case coordinator who will send a bus pass and resources.  Pt given bus pass and resources.  Pt upset because someone had promised we would get him a ride back to [place].  Called case coordinator to see if our transportation was a possibility to help pt get to [place].  Per Patty that is not a option.  Primary RN and Supervisor at bedside.

## 2017-07-11 NOTE — PROGRESS NOTES
Infectious Disease Progress Note    Author: Meenu Galaviz M.D. DOS & Time created: 2017  12:56 PM    Chief Complaint:  Chief Complaint   Patient presents with   • Possible Stroke     Interval History:  7/10 AF, WBC 3.3, LP not done, MRI with no acute abnl, having diarrhea which is new   AF, WBC 4.5, pt upset that he has over $3k in hiking gear in [place], still feels fatigued, states he gets intermittent dysarthria when fatigued  Labs Reviewed, Medications Reviewed, Radiology Reviewed and Wound Reviewed.    Review of Systems:  Review of Systems   Constitutional: Negative for fever and chills.   Respiratory: Negative for cough and shortness of breath.    Gastrointestinal: Positive for diarrhea. Negative for nausea, vomiting and abdominal pain.   Skin: Positive for rash.   Neurological: Negative for headaches.       Hemodynamics:  Temp (24hrs), Av.6 °C (97.8 °F), Min:36.2 °C (97.1 °F), Max:36.9 °C (98.4 °F)  Temperature: 36.7 °C (98.1 °F)  Pulse  Av.1  Min: 52  Max: 80  Blood Pressure: 128/79 mmHg       Physical Exam:  Physical Exam   Constitutional: He is oriented to person, place, and time. He appears well-developed.   HENT:   Head: Normocephalic and atraumatic.   Eyes: EOM are normal. Pupils are equal, round, and reactive to light.   Neck: Neck supple.   Cardiovascular: Normal rate, regular rhythm and normal heart sounds.    Pulmonary/Chest: Effort normal and breath sounds normal.   Abdominal: Soft. There is no tenderness.   Musculoskeletal: He exhibits no edema.   Neurological: He is alert and oriented to person, place, and time.   Skin:   Diffuse mosquito bites with scabbing       Meds:    Current facility-administered medications:   •  aspirin (ASA) tablet 325 mg, 325 mg, Oral, DAILY, Ankit Edmondson M.D., 325 mg at 17 1025  •  darunavir (PREZISTA) tablet 600 mg, 600 mg, Oral, BID WITH MEALS, Ankit Edmondson M.D., 600 mg at 17 1025  •  Emtricitabine-Tenofovir -25 MG  TABS 1 Tab, 1 Tab, Oral, DAILY, Ankit Edmondson M.D., 1 Tab at 07/11/17 1025  •  ibuprofen (MOTRIN) tablet 200 mg, 200 mg, Oral, Q6HRS PRN, Ankit Edmondson M.D.  •  raltegravir (ISENTRESS) tablet 400 mg, 400 mg, Oral, BID, Ankit Edmondson M.D., 400 mg at 07/11/17 1025  •  ritonavir (NORVIR) tablet 100 mg, 100 mg, Oral, BID WITH MEALS, Ankit Edmondson M.D., 100 mg at 07/11/17 1025  •  senna-docusate (PERICOLACE or SENOKOT S) 8.6-50 MG per tablet 2 Tab, 2 Tab, Oral, BID, 2 Tab at 07/10/17 0943 **AND** polyethylene glycol/lytes (MIRALAX) PACKET 1 Packet, 1 Packet, Oral, QDAY PRN **AND** magnesium hydroxide (MILK OF MAGNESIA) suspension 30 mL, 30 mL, Oral, QDAY PRN **AND** bisacodyl (DULCOLAX) suppository 10 mg, 10 mg, Rectal, QDAY PRN, Ankit Edmondson M.D.  •  NS infusion, , Intravenous, Continuous, Ankit Edmondson M.D., Last Rate: 125 mL/hr at 07/11/17 0632  •  enoxaparin (LOVENOX) inj 40 mg, 40 mg, Subcutaneous, DAILY, Ankit Edmondson M.D., 40 mg at 07/10/17 0943  •  sulfamethoxazole-trimethoprim (BACTRIM) 400-80 MG per tablet 1 Tab, 1 Tab, Oral, DAILY, Joanne Ivan M.D., 1 Tab at 07/11/17 1025  •  doxycycline monohydrate (ADOXA) tablet 100 mg, 100 mg, Oral, Q12HRS, Ankit Edmondson M.D., 100 mg at 07/11/17 1025  •  diphenhydrAMINE (BENADRYL) tablet/capsule 25 mg, 25 mg, Oral, Q8HRS PRN, Ankit Edmondson M.D., Stopped at 07/10/17 2155    Labs:  Recent Labs      07/09/17   1004  07/10/17   0330  07/11/17   0305   WBC  3.2*  3.3*  4.5*   RBC  4.32*  4.13*  4.34*   HEMOGLOBIN  14.7  14.1  14.6   HEMATOCRIT  41.1*  40.8*  42.5   MCV  95.1  98.8*  97.9*   MCH  34.0*  34.1*  33.6*   RDW  44.1  45.1  44.9   PLATELETCT  170  137*  161*   MPV  9.8  9.8  9.9   NEUTSPOLYS  54.10   --    --    LYMPHOCYTES  28.10   --    --    MONOCYTES  12.00   --    --    EOSINOPHILS  4.90   --    --    BASOPHILS  0.60   --    --      Recent Labs      07/09/17   1004  07/10/17   0330  07/11/17   0305   SODIUM  135  133*  134*   POTASSIUM  3.7   3.7  4.0   CHLORIDE  105  109  109   CO2  21  16*  21   GLUCOSE  83  85  86   BUN  15  13  14     Recent Labs      07/09/17   1004  07/10/17   0330  07/11/17   0305   ALBUMIN  3.7   --    --    TBILIRUBIN  2.4*   --    --    ALKPHOSPHAT  71   --    --    TOTPROTEIN  6.8   --    --    ALTSGPT  16   --    --    ASTSGOT  34   --    --    CREATININE  0.61  0.56  0.77       Imaging:  Ct-head W/o    7/9/2017 7/9/2017 11:10 AM HISTORY/REASON FOR EXAM:  Neurological Deficit. History of brain surgery TECHNIQUE/EXAM DESCRIPTION AND NUMBER OF VIEWS: CT of the head without contrast. The study was performed on a helical multidetector CT scanner. Contiguous 2.5 mm axial sections were obtained from the skull base through the vertex. Up to date radiation dose reduction adjustments have been utilized to meet ALARA standards for radiation dose reduction. COMPARISON:  None available FINDINGS: Patient is status post right frontoparietal craniotomy. There is underlying encephalomalacia. No intracranial hemorrhage, midline shift or mass effect. No extra-axial fluid collections identified. There is ex vacuo dilatation of the right lateral ventricle. Paranasal sinuses in the field of view are unremarkable. Mastoids in the field of view are unremarkable.     7/9/2017  Status post right craniotomy with underlying encephalomalacia without evidence of acute intracranial process.    Dx-chest-portable (1 View)    7/9/2017 7/9/2017 11:04 AM HISTORY/REASON FOR EXAM:  Sepsis. Chest tightness. TECHNIQUE/EXAM DESCRIPTION AND NUMBER OF VIEWS: Single portable view of the chest. COMPARISON: None FINDINGS: Heart size is within normal limits. No pulmonary infiltrates or consolidations are noted. No pleural abnormalities are noted.     7/9/2017  No acute cardiac or pulmonary abnormalities are identified.    Mr-brain-with & W/o    7/10/2017  7/9/2017 6:27 PM HISTORY/REASON FOR EXAM:  Previous craniotomy. History of stroke and dysarthria. Currently with  slurred speech.. TECHNIQUE/EXAM DESCRIPTION:   MRI of the brain without and with contrast. T1 sagittal, T2 fast spin-echo axial, T1 coronal, FLAIR coronal, diffusion-weighted and apparent diffusion coefficient (ADC map) axial images were obtained of the whole brain. T1 postcontrast axial and T1 postcontrast coronal images were obtained. The study was performed on a [employer] Signa 1.5 Vee MRI scanner. 17 mL Omniscan contrast was administered intravenously. COMPARISON:  Head CT 7/9/2017 FINDINGS: Previous right frontal craniotomy. There is a small post ovoid postsurgical defect in the right posterior frontal lobe. There is surrounding area of decreased T1 and increased T2 signal intensity in the adjacent right frontal white matter. There is no significant abnormal enhancement in this region.. There are no extra-axial fluid collections. The ventricular system and basal cisterns are mild to moderately prominent. There is mild ex vacuo dilatation of the right frontal horn. There is mild prominence of the cortical sulci and gyri. Additionally there is curvilinear region of increased T2 signal intensity in the subcortical white matter in the left posterior frontal region. There are no mass effects or shift of midline structures. There are  no hemorrhagic lesions. The diffusion-weighted axial images show no evidence of acute cerebral infarction. The postcontrast images show no areas of abnormal parenchymal or meningeal enhancement. The brainstem and posterior fossa structures are unremarkable. Vascular flow voids in the vertebrobasilar and carotid arteries, [place] of [place], and dural venous sinuses are intact. The paranasal sinuses and mastoids in the field of view are unremarkable.     7/10/2017  1.  Previous right frontal craniotomy with underlying postsurgical defect and surrounding encephalomalacia. 2.  No evidence of abnormal enhancement in the brain parenchyma. 3.  Mild diffuse cerebral atrophy with mild ex vacuo  dilatation of the right frontal horn. 4.  Small chronic area of microvascular ischemic change or chronic infarction in the subcortical white matter in the left posterior frontal region.      Micro:  BLOOD CULTURE   Date Value Ref Range Status   07/09/2017   Preliminary    No Growth    Note: Blood cultures are incubated for 5 days and  are monitored continuously.Positive blood cultures  are called to the RN and reported as soon as  they are identified.          Assessment:  Active Hospital Problems    Diagnosis   • Dysarthria [R47.1]   • HIV (human immunodeficiency virus infection) (CMS-[facility]) [Z21]       Plan:  Possible CVA  CT head neg  MRI negative for acute abnl  Lyme serology pending  Continue doxy x 14 days  LP canceled    HIV  CD4 count - pending  HIV viral load - pending  Continue bactrim for PCP ppx  Follows up with HIV clinic near home in [place]  Continue ART    Leukopenia  ? Chronic    Discussed with Dr. Wright

## 2017-07-11 NOTE — PROGRESS NOTES
Report received from [facility]. Pt eating dinner. Assessed pt. A&O x4. Denies n/t, nausea, pain, and SOB. POC discussed with pt. Hourly rounding in place. Bed in low position, call light within reach.

## 2017-07-11 NOTE — DISCHARGE PLANNING
"This CM and ANUJA Porras met with pt @ bedside.  Previously called [facility] dispatch and per supervisor [facility] picked up pt on [facility] after rec 911 call generated by pt.  [facility] sup read report and stated no mention was made of pt refusing transfer, and per policy, medics were instructed to never promise rides home but was not personally present on this particular occasion.  Previously spoke with manager Bonnie Maciel and also called [employer] and one way trip to [place] would be around [phone].  Gave this info to pt and he wasn't happy and stated that was too much for \"an hour and a half drive\". This CM agreed but stated it was the cheapest option and [employer] didn't service [place].  He stated he wanted to leave and would hitch hike.  While discussing POC with manager Nicole @ nurses station pt came to nurses station and was very angry stating he wanted to leave and that \"If I die it's on you!!\"  Primary RN had called security.  "

## 2017-07-11 NOTE — PROGRESS NOTES
"Case coordinators Nuno at bedside to give pt option for transportation back to [place], [place].  Pt can pay for ride ($270) to destination.  Pt states he doesn't have the money.  Pt asked if he can ask family to help him, pt states, \"I can't put them through that.\"  Case coordinators asked for security.  RN called security.  Security at bedside.  Pt removed PIV self.  Pt refused to sign d/c paperwork.  Pt instructed verbally to fill rx's and to take bus pass.  Pt at first unwilling however pt did take rx's and bus pass.  Security escorted pt off floor.  "

## 2017-07-12 LAB
ANNOTATION COMMENT IMP: ABNORMAL
CD3 CELLS # BLD: 758 CELLS/UL (ref 570–2400)
CD3+CD4+ CELLS # BLD: 97 CELLS/UL (ref 430–1800)
CD3+CD4+ CELLS/CD3+CD8+ CLL BLD: 0.15 RATIO (ref 0.8–3.9)
CD3+CD8+ CELLS # BLD: 651 CELLS/UL (ref 210–1200)
HIV 1 PRO DNA BLD QL NAA+PROBE: DETECTED

## 2017-07-14 LAB
BACTERIA BLD CULT: NORMAL
BACTERIA BLD CULT: NORMAL
SIGNIFICANT IND 70042: NORMAL
SIGNIFICANT IND 70042: NORMAL
SITE SITE: NORMAL
SITE SITE: NORMAL
SOURCE SOURCE: NORMAL
SOURCE SOURCE: NORMAL
